# Patient Record
Sex: MALE | Race: WHITE | NOT HISPANIC OR LATINO | Employment: FULL TIME | URBAN - METROPOLITAN AREA
[De-identification: names, ages, dates, MRNs, and addresses within clinical notes are randomized per-mention and may not be internally consistent; named-entity substitution may affect disease eponyms.]

---

## 2017-06-27 ENCOUNTER — TRANSCRIBE ORDERS (OUTPATIENT)
Dept: ADMINISTRATIVE | Facility: HOSPITAL | Age: 22
End: 2017-06-27

## 2017-06-27 DIAGNOSIS — E05.90 THYROTOXICOSIS, UNSPECIFIED WITHOUT THYROTOXIC CRISIS OR STORM: Primary | ICD-10-CM

## 2017-07-03 ENCOUNTER — HOSPITAL ENCOUNTER (OUTPATIENT)
Dept: ULTRASOUND IMAGING | Facility: HOSPITAL | Age: 22
Discharge: HOME | End: 2017-07-03
Admitting: FAMILY MEDICINE

## 2017-07-03 DIAGNOSIS — E05.90 THYROTOXICOSIS, UNSPECIFIED WITHOUT THYROTOXIC CRISIS OR STORM: ICD-10-CM

## 2017-07-03 PROCEDURE — 76536 US EXAM OF HEAD AND NECK: CPT

## 2022-06-13 ENCOUNTER — HOSPITAL ENCOUNTER (EMERGENCY)
Facility: HOSPITAL | Age: 27
Discharge: HOME | End: 2022-06-13
Attending: EMERGENCY MEDICINE | Admitting: EMERGENCY MEDICINE

## 2022-06-13 ENCOUNTER — APPOINTMENT (OUTPATIENT)
Dept: GENERAL RADIOLOGY | Facility: HOSPITAL | Age: 27
End: 2022-06-13

## 2022-06-13 VITALS
TEMPERATURE: 98.7 F | RESPIRATION RATE: 12 BRPM | BODY MASS INDEX: 22.81 KG/M2 | HEART RATE: 69 BPM | SYSTOLIC BLOOD PRESSURE: 123 MMHG | WEIGHT: 154 LBS | OXYGEN SATURATION: 97 % | DIASTOLIC BLOOD PRESSURE: 73 MMHG | HEIGHT: 69 IN

## 2022-06-13 DIAGNOSIS — S60.042A CONTUSION OF LEFT RING FINGER WITHOUT DAMAGE TO NAIL, INITIAL ENCOUNTER: Primary | ICD-10-CM

## 2022-06-13 PROCEDURE — 73140 X-RAY EXAM OF FINGER(S): CPT

## 2022-06-13 PROCEDURE — 99282 EMERGENCY DEPT VISIT SF MDM: CPT | Performed by: EMERGENCY MEDICINE

## 2022-06-13 PROCEDURE — 99282 EMERGENCY DEPT VISIT SF MDM: CPT

## 2022-06-13 NOTE — DISCHARGE INSTRUCTIONS
Rest, ice, elevate the finger as needed.  May take Tylenol or ibuprofen every 12 hours as needed for pain.

## 2022-06-13 NOTE — ED PROVIDER NOTES
EMERGENCY DEPARTMENT ENCOUNTER    Room Number:  01/01  Date seen:  6/13/2022  PCP: Camden Schreiber MD  Historian: Patient      HPI:  Chief Complaint: Finger injury  A complete HPI/ROS/PMH/PSH/SH/FH are unobtainable due to: N/A  Context: Prieto Ahumada is a 27 y.o. male who presents to the ED c/o an accidental injury to his left ring finger.  He was apparently trying to dispose of a large log at this evening but as he was dropping it into his trash can, the log accidentally smashed his ring finger against the edge of the trash can causing immediate pain.  Since then the finger has swollen up.  He is concerned that he may have fractured his finger and he wanted to be evaluated before returning to work at the [employer].            PAST MEDICAL HISTORY  Active Ambulatory Problems     Diagnosis Date Noted   • No Active Ambulatory Problems     Resolved Ambulatory Problems     Diagnosis Date Noted   • No Resolved Ambulatory Problems     Past Medical History:   Diagnosis Date   • Disease of thyroid gland          PAST SURGICAL HISTORY  History reviewed. No pertinent surgical history.      FAMILY HISTORY  History reviewed. No pertinent family history.      SOCIAL HISTORY  Social History     Socioeconomic History   • Marital status: Single   Tobacco Use   • Smoking status: Former Smoker     Quit date: 2019     Years since quitting: 3.4   • Smokeless tobacco: Never Used   Vaping Use   • Vaping Use: Every day   • Substances: Nicotine, Flavoring   • Devices: Refillable tank   • Passive vaping exposure: Yes   Substance and Sexual Activity   • Alcohol use: Yes     Comment: Drinks every other week.   • Drug use: Defer   • Sexual activity: Defer         ALLERGIES  Patient has no known allergies.        REVIEW OF SYSTEMS  Review of Systems   Constitutional: Negative for activity change and fever.   HENT: Negative.    Eyes: Negative for pain and visual disturbance.   Respiratory: Negative for cough and shortness of breath.     Cardiovascular: Negative for chest pain.   Gastrointestinal: Negative for abdominal pain.   Musculoskeletal: Positive for arthralgias and joint swelling.   Skin: Negative for color change.   Neurological: Negative for syncope, weakness and headaches.   All other systems reviewed and are negative.           PHYSICAL EXAM  ED Triage Vitals [06/13/22 0024]   Temp Heart Rate Resp BP SpO2   98.7 °F (37.1 °C) 69 12 123/73 97 %      Temp src Heart Rate Source Patient Position BP Location FiO2 (%)   Oral Monitor -- -- --         GENERAL: Pleasant young man, no acute distress  HENT: nares patent, normocephalic and atraumatic  EYES: no scleral icterus, EOMI  CV: regular rhythm, normal rate, normal pulses  RESPIRATORY: normal effort, no stridor  MUSCULOSKELETAL: The left fourth finger is tender to palpation at the DIP joint with some soft tissue swelling overlying the distal phalanx and middle phalanx.  There is a small abrasion injury to the dorsal aspect of the DIP as well.  Patient is able to flex and extend the fourth finger through normal range of motion's at all joints appropriately.  Distal sensation appears to be intact.  Remainder of the hand bones and soft tissue appears normal and uninjured.  NEURO: alert, moves all extremities, follows commands  PSYCH:  calm, cooperative  SKIN: warm, dry    Vital signs and nursing notes reviewed.          LAB RESULTS  No results found for this or any previous visit (from the past 24 hour(s)).    Ordered the above labs and reviewed the results.        RADIOLOGY  XR Finger 2+ View Left    Result Date: 6/13/2022  CR Fingers Min 2 Vws LT INDICATION: Crush injury to the finger. Pain. COMPARISON: None available FINDINGS: PA, lateral, and oblique views of the left fourth finger.  No fracture or dislocation.  No bone erosion or destruction.  No foreign body.     Negative left fourth finger. Signer Name: González Ruiz MD  Signed: 6/13/2022 1:06 AM  Workstation Name: [email or web address]   "[facility]      Ordered the above noted radiological studies. Reviewed by me in PACS.            PROCEDURES  Procedures              MEDICATIONS GIVEN IN ER  Medications - No data to display                MEDICAL DECISION MAKING, PROGRESS, and CONSULTS    All labs have been independently reviewed by me.  All radiology studies have been reviewed by me and discussed with radiologist dictating the report.   EKG's independently viewed and interpreted by me.  Discussion below represents my analysis of pertinent findings related to patient's condition, differential diagnosis, treatment plan and final disposition.          ED Course as of 06/13/22 0258 Mon Jun 13, 2022 0258 I reviewed the x-ray which is negative for any osseous injury or foreign body.  Therefore I advised rice therapy with nonsteroidal anti-inflammatories as needed.  Patient discharged home in good condition with usual \"return to ER\" instructions. [BUTCH]      ED Course User Index  [BUTCH] Carlo Lerma MD                  DIAGNOSIS  Final diagnoses:   Contusion of left ring finger without damage to nail, initial encounter         DISPOSITION  Home            Latest Documented Vital Signs:  As of 02:58 EDT  BP- 123/73 HR- 69 Temp- 98.7 °F (37.1 °C) (Oral) O2 sat- 97%        --    Please note that portions of this were completed with a voice recognition program.          Carlo Lerma MD  06/13/22 0258    "